# Patient Record
Sex: FEMALE | Race: WHITE | Employment: OTHER | URBAN - METROPOLITAN AREA
[De-identification: names, ages, dates, MRNs, and addresses within clinical notes are randomized per-mention and may not be internally consistent; named-entity substitution may affect disease eponyms.]

---

## 2017-08-30 ENCOUNTER — ALLSCRIPTS OFFICE VISIT (OUTPATIENT)
Dept: OTHER | Facility: OTHER | Age: 67
End: 2017-08-30

## 2017-08-30 DIAGNOSIS — G40.409 OTHER GENERALIZED EPILEPSY AND EPILEPTIC SYNDROMES, NOT INTRACTABLE, WITHOUT STATUS EPILEPTICUS (HCC): ICD-10-CM
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
## 2018-01-13 VITALS
BODY MASS INDEX: 30.96 KG/M2 | WEIGHT: 168.25 LBS | HEART RATE: 60 BPM | OXYGEN SATURATION: 99 % | DIASTOLIC BLOOD PRESSURE: 72 MMHG | SYSTOLIC BLOOD PRESSURE: 134 MMHG | HEIGHT: 62 IN

## 2018-03-28 LAB
ALBUMIN SERPL BCP-MCNC: 3.8 G/DL (ref 3–5.2)
AMORPHOUS MATERIAL (HISTORICAL): ABNORMAL
ANION GAP SERPL CALCULATED.3IONS-SCNC: 8 MMOL/L (ref 5–14)
BACTERIA UR QL AUTO: ABNORMAL
BILIRUB UR QL STRIP: NEGATIVE MG/DL
BUN SERPL-MCNC: 10 MG/DL (ref 5–25)
CALCIUM SERPL-MCNC: 9.3 MG/DL (ref 8.4–10.2)
CASTS/CASTS TYPE (HISTORICAL): ABNORMAL /LPF
CHLORIDE SERPL-SCNC: 96 MEQ/L (ref 97–108)
CLARITY UR: CLEAR
CO2 SERPL-SCNC: 26 MMOL/L (ref 22–30)
COLOR UR: ABNORMAL
CREATINE, SERUM (HISTORICAL): 0.67 MG/DL (ref 0.6–1.2)
CREATININE, RANDOM URINE (HISTORICAL): 127.8 MG/DL (ref 50–200)
CRYSTAL TYPE (HISTORICAL): ABNORMAL /HPF
DEPRECATED RDW RBC AUTO: 13.1 %
EGFR (HISTORICAL): >60 ML/MIN/1.73 M2
GLUCOSE SERPL-MCNC: 98 MG/DL (ref 70–99)
GLUCOSE UR STRIP-MCNC: NEGATIVE MG/DL
HCT VFR BLD AUTO: 37.8 % (ref 36–46)
HGB BLD-MCNC: 12.9 G/DL (ref 12–16)
HGB UR QL STRIP.AUTO: ABNORMAL
KETONES UR STRIP-MCNC: NEGATIVE MG/DL
LEUKOCYTE ESTERASE UR QL STRIP: ABNORMAL
MAGNESIUM SERPL-MCNC: 1.9 MG/DL (ref 1.6–2.3)
MCH RBC QN AUTO: 29.7 PG (ref 26–34)
MCHC RBC AUTO-ENTMCNC: 34 % (ref 31–36)
MCV RBC AUTO: 87 FL (ref 80–100)
MUCOUS THREADS URNS QL MICRO: ABNORMAL
NITRITE UR QL STRIP: NEGATIVE
NON-SQ EPI CELLS URNS QL MICRO: ABNORMAL
OTHER STN SPEC: ABNORMAL
PH UR STRIP.AUTO: 7 [PH] (ref 4.5–8)
PHOSPHATE SERPL-MCNC: 4.3 MG/DL (ref 2.5–4.8)
PLATELET # BLD AUTO: 458 K/MCL (ref 150–450)
POTASSIUM SERPL-SCNC: 4.4 MEQ/L (ref 3.6–5)
PROT UR STRIP-MCNC: NEGATIVE MG/DL
PROT UR-MCNC: 7 MG/DL
PROT/CREAT UR: 55 MG/G
RBC # BLD AUTO: 4.33 M/MCL (ref 4–5.2)
RBC #/AREA URNS AUTO: 2 /HPF
SODIUM SERPL-SCNC: 131 MEQ/L (ref 137–147)
SP GR UR STRIP.AUTO: 1.01 (ref 1–1.04)
URIC ACID (HISTORICAL): 2.7 MG/DL (ref 2.5–7.5)
UROBILINOGEN UR QL STRIP.AUTO: NEGATIVE MG/DL (ref 0–1)
WBC # BLD AUTO: 5.4 K/MCL (ref 4.5–11)
WBC #/AREA URNS AUTO: ABNORMAL /HPF

## 2018-09-07 ENCOUNTER — OFFICE VISIT (OUTPATIENT)
Dept: NEUROLOGY | Facility: CLINIC | Age: 68
End: 2018-09-07
Payer: MEDICARE

## 2018-09-07 VITALS
BODY MASS INDEX: 31.5 KG/M2 | DIASTOLIC BLOOD PRESSURE: 77 MMHG | HEIGHT: 62 IN | WEIGHT: 171.2 LBS | SYSTOLIC BLOOD PRESSURE: 166 MMHG | HEART RATE: 74 BPM

## 2018-09-07 DIAGNOSIS — G40.409 TONIC-CLONIC EPILEPTIC SEIZURES (HCC): Primary | ICD-10-CM

## 2018-09-07 PROCEDURE — 99213 OFFICE O/P EST LOW 20 MIN: CPT | Performed by: NURSE PRACTITIONER

## 2018-09-07 RX ORDER — LEVETIRACETAM 500 MG/1
500 TABLET ORAL
COMMUNITY
Start: 2014-06-06 | End: 2018-09-07 | Stop reason: SDUPTHER

## 2018-09-07 RX ORDER — BIOTIN 1 MG
TABLET ORAL DAILY
COMMUNITY

## 2018-09-07 RX ORDER — AMLODIPINE BESYLATE 5 MG/1
10 TABLET ORAL
COMMUNITY
Start: 2015-05-26

## 2018-09-07 RX ORDER — FOSINOPRIL SODIUM 40 MG/1
40 TABLET ORAL
COMMUNITY
Start: 2018-03-26

## 2018-09-07 RX ORDER — LEVETIRACETAM 500 MG/1
TABLET ORAL
Qty: 135 TABLET | Refills: 3 | Status: SHIPPED | OUTPATIENT
Start: 2018-09-07 | End: 2019-03-11 | Stop reason: SDUPTHER

## 2018-09-07 NOTE — ASSESSMENT & PLAN NOTE
No seizures since her last visit  She continues on keppra 250mg in the am and 500mg in the pm without any noted side effects  She may still be interested in reducing her dose further to 250mg BID, but she will get a keppra level checked first  A script was given for this today  She continues to follow closely with nephrology in regards to her hyponatremia and her levels have been stable recently  She was encouraged to call with any new or suspected seizure activity

## 2018-09-07 NOTE — PROGRESS NOTES
Patient ID: Will Eaton is a 77 y o  female  Assessment/Plan:    Tonic-clonic epileptic seizures ([address] )  No seizures since her last visit  She continues on keppra 250mg in the am and 500mg in the pm without any noted side effects  She may still be interested in reducing her dose further to 250mg BID, but she will get a keppra level checked first  A script was given for this today  She continues to follow closely with nephrology in regards to her hyponatremia and her levels have been stable recently  She was encouraged to call with any new or suspected seizure activity  Subjective:    Ms Will Eaton is a 42-year-old female with a history of 2 generalized tonic-clonic seizures, one in 1959, the second in 2012 who is here today for her annual follow-up  To review, for over 40 years prior to her second seizure, the patient had remained seizure-free without taking any seizure medications  The seizure in 2012 occurred in the setting of severe hyponatremia and malignant hypertension  It is possible, however, that apart from the generalized tonic-clonic seizures, the "day-dreaming episodes" that the patient had been having over the years were also seizures, as they stopped occurring after she was put on Keppra in 2012  MRI of the brain done in 2012 showed an area of 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  EEG showed an asymmetric driving response, less prominent on the right than on the left, suggesting subtle right hemisphere dysfunction  When Dr Katia Pelletier saw the patient on 8/2/16, the patient reported that she did not want to come off of her [address] because she believed that her being on [address] would reduce the chances of seizures occurring should she suffer recurrence of hyponatremia   However, she did want to reduce her dose, and since her level was 26, I reduced her dose from levetiracetam 500 mg tablets, 1 tab BID to ½ tab in the AM and 1 tab in the PM  There was some further discussion of decreasing the keppra to 250mg BID if her labs remained stable  Today Ms Antonette Lindsey presents for follow-up  She states that she has been doing well and has not had any seizures since her last visit  She remains on keppra 250mg in the am and 500mg in the pm  She is interested in possibly continuing to reduce the dose further, but would like to have her labs checked again before doing so  She denies any history of myoclonus, staring spells, automatisms, unexplained hyperkinetic behaviors, olfactory / gustatory hallucinations, epigastric rising events, shay vu events, visual hallucinations, unexplained nocturnal enuresis, or nocturnal tongue biting  She feels that her mood is overall stable  She continues to follow with nephrology, who is keeping an eye on her sodium  The following portions of the patient's history were reviewed and updated as appropriate: past family history, past social history and past surgical history  Objective:    Blood pressure 166/77, pulse 74, height 5' 2" (1 575 m), weight 77 7 kg (171 lb 3 2 oz)  Physical Exam   Constitutional: She appears well-developed and well-nourished  HENT:   Head: Normocephalic  Neurological: She has normal strength  Gait normal    Psychiatric: She has a normal mood and affect  Her speech is normal and behavior is normal    Vitals reviewed  Neurological Exam    Mental Status  The patient is alert  Her speech is normal  She has normal attention span and concentration  She follows multi-step commands  She has a normal fund of knowledge  Cranial Nerves    CN II: The patient's visual acuity and visual fields are normal     Motor  The patient has normal muscle bulk throughout  Her overall muscle tone is normal throughout  Her strength is 5/5 throughout all four extremities  Sensory  The patient's sensation is to light touch  Gait and Coordination  The patient has normal gait and station            ROS:    Review of Systems Constitutional: Positive for unexpected weight change  HENT: Positive for trouble swallowing  Sinus Problems   Hoarseness    Eyes: Negative  Respiratory: Negative  Cardiovascular: Negative  Gastrointestinal: Negative  Endocrine: Negative  Genitourinary: Negative  Musculoskeletal: Positive for neck pain  Joint Pain    Skin: Negative  Allergic/Immunologic: Negative  Neurological: Negative  Hematological: Negative  Psychiatric/Behavioral: Positive for sleep disturbance (Trouble staying asleep)

## 2018-09-07 NOTE — PATIENT INSTRUCTIONS
1  Get your levetiracetam level checked  If the level is stable, we can consider decreasing the keppra dose further if you would like  2  Continue on levetiracetam 250mg in the morning and 500mg in the evening  3  Call with any new or suspected seizures

## 2019-03-11 DIAGNOSIS — G40.409 TONIC-CLONIC EPILEPTIC SEIZURES (HCC): ICD-10-CM

## 2019-03-11 RX ORDER — LEVETIRACETAM 500 MG/1
TABLET ORAL
Qty: 135 TABLET | Refills: 3 | Status: SHIPPED | OUTPATIENT
Start: 2019-03-11 | End: 2020-04-01 | Stop reason: SDUPTHER

## 2019-03-11 NOTE — TELEPHONE ENCOUNTER
Pt requesting keppra rx be sent to [facility]  Rx entered  Pls sign off       Fax   ID [phone]

## 2019-04-08 ENCOUNTER — TELEPHONE (OUTPATIENT)
Dept: NEUROLOGY | Facility: CLINIC | Age: 69
End: 2019-04-08

## 2019-06-04 ENCOUNTER — TELEPHONE (OUTPATIENT)
Dept: NEUROLOGY | Facility: CLINIC | Age: 69
End: 2019-06-04

## 2019-06-04 ENCOUNTER — OFFICE VISIT (OUTPATIENT)
Dept: NEUROLOGY | Facility: CLINIC | Age: 69
End: 2019-06-04
Payer: MEDICARE

## 2019-06-04 VITALS
BODY MASS INDEX: 23.32 KG/M2 | SYSTOLIC BLOOD PRESSURE: 144 MMHG | DIASTOLIC BLOOD PRESSURE: 77 MMHG | HEART RATE: 83 BPM | HEIGHT: 62 IN | WEIGHT: 126.7 LBS

## 2019-06-04 DIAGNOSIS — R42 DIZZINESS AND GIDDINESS: Primary | ICD-10-CM

## 2019-06-04 DIAGNOSIS — F33.1 MODERATE EPISODE OF RECURRENT MAJOR DEPRESSIVE DISORDER (HCC): ICD-10-CM

## 2019-06-04 DIAGNOSIS — R26.89 IMBALANCE: ICD-10-CM

## 2019-06-04 DIAGNOSIS — G40.409 OTHER GENERALIZED EPILEPSY AND EPILEPTIC SYNDROMES, NOT INTRACTABLE, WITHOUT STATUS EPILEPTICUS (HCC): ICD-10-CM

## 2019-06-04 PROCEDURE — 99215 OFFICE O/P EST HI 40 MIN: CPT | Performed by: NURSE PRACTITIONER

## 2019-06-04 RX ORDER — MIRTAZAPINE 7.5 MG/1
7.5 TABLET, FILM COATED ORAL
Qty: 30 TABLET | Refills: 3 | Status: SHIPPED | OUTPATIENT
Start: 2019-06-04

## 2019-06-18 ENCOUNTER — HOSPITAL ENCOUNTER (OUTPATIENT)
Dept: CT IMAGING | Facility: HOSPITAL | Age: 69
Discharge: HOME | End: 2019-06-18
Payer: MEDICARE

## 2019-06-18 DIAGNOSIS — R26.89 IMBALANCE: ICD-10-CM

## 2019-06-18 DIAGNOSIS — R42 DIZZINESS AND GIDDINESS: ICD-10-CM

## 2019-06-18 DIAGNOSIS — G40.409 OTHER GENERALIZED EPILEPSY AND EPILEPTIC SYNDROMES, NOT INTRACTABLE, WITHOUT STATUS EPILEPTICUS (HCC): ICD-10-CM

## 2019-06-18 PROCEDURE — 70496 CT ANGIOGRAPHY HEAD: CPT

## 2019-06-18 PROCEDURE — 70498 CT ANGIOGRAPHY NECK: CPT

## 2019-06-18 RX ADMIN — IOHEXOL 100 ML: 350 INJECTION, SOLUTION INTRAVENOUS at 12:08

## 2019-06-24 ENCOUNTER — TELEPHONE (OUTPATIENT)
Dept: NEUROLOGY | Facility: CLINIC | Age: 69
End: 2019-06-24

## 2019-07-03 ENCOUNTER — TELEPHONE (OUTPATIENT)
Dept: NEUROLOGY | Facility: CLINIC | Age: 69
End: 2019-07-03

## 2019-07-03 NOTE — TELEPHONE ENCOUNTER
----- Message from Juanito Sharma MD sent at 7/3/2019  5:22 PM EDT -----  Regarding: FW: Test Results Question  Contact: [phone]  I haven't seen this patient since 2017, but Maxine Singh saw her recently and ordered CTA head and neck because the patient had been having dizzy spells and has two family members who  of ruptured cerebral aneurysms  Please call the patient's daughter and reassure her that the CT did not show any aneurysms  There are however several arteries with plaques and some narrowing  It's POSSIBLE that the narrowing and the plaques are causing the dizzy spells, but it's MUCH more likely that the dizziness is caused by the patient's poor oral intake  Per [facility] note, the patient's  passed away a few months ago, and since then the patient's weight dropped from 171 lbs to 126 lbs  She reportedly eats 2-3 meals a day but not much each time  I recommend that the patient see her PCP about the weight loss and the dizziness  If the patient's PO intake improves and she continues to have dizzy spells, then we need to make an appointment for the patient to see one of our general neurologists or stroke specialists to see if the abnormalities on the patient's CTA are responsible for the episodes  Daughter should call us right away if the dizziness gets worse or the patient starts having other neurologic deficits, e g  focal weakness/numbness, speech difficulty, visual loss or double vision  If any of these deficits start suddenly, pt should go to the nearest [address] [place] [address] for possible stroke    ----- Message -----  From: Sue Melendez  Sent: 2019  11:29 AM EDT  To: Juanito Sharma MD  Subject: FW: Test Results Question                            ----- Message -----  From: Justin De La Rosa  Sent: 2019  11:26 AM EDT  To: Neurology [facility]  Subject: Test Results Question                            Hi-  Just following up on the CT results   It seems like there is nothing concerning but wanted to double check  Any information would be helpful  My Mom has agreed to start the Remeron and has been on it for a week now  She also is going to be starting therapy     Thanks,  Minesh Jacinto

## 2020-02-01 ENCOUNTER — TRANSCRIBE ORDERS (OUTPATIENT)
Dept: ADMINISTRATIVE | Facility: HOSPITAL | Age: 70
End: 2020-02-01

## 2020-02-01 ENCOUNTER — APPOINTMENT (OUTPATIENT)
Dept: LAB | Facility: IMAGING CENTER | Age: 70
End: 2020-02-01
Payer: MEDICARE

## 2020-02-01 DIAGNOSIS — I25.84 CORONARY ARTERY CALCIFICATION: Primary | ICD-10-CM

## 2020-02-01 DIAGNOSIS — E78.2 MIXED HYPERLIPIDEMIA: ICD-10-CM

## 2020-02-01 DIAGNOSIS — I25.84 CORONARY ARTERY CALCIFICATION: ICD-10-CM

## 2020-02-01 DIAGNOSIS — I25.10 CORONARY ARTERY CALCIFICATION: Primary | ICD-10-CM

## 2020-02-01 DIAGNOSIS — I25.10 CORONARY ARTERY CALCIFICATION: ICD-10-CM

## 2020-02-01 LAB
CHOLEST SERPL-MCNC: 244 MG/DL (ref 50–200)
HDLC SERPL-MCNC: 64 MG/DL
LDLC SERPL CALC-MCNC: 161 MG/DL (ref 0–100)
NONHDLC SERPL-MCNC: 180 MG/DL
TRIGL SERPL-MCNC: 94 MG/DL

## 2020-02-01 PROCEDURE — 36415 COLL VENOUS BLD VENIPUNCTURE: CPT

## 2020-02-01 PROCEDURE — 80061 LIPID PANEL: CPT

## 2020-04-01 DIAGNOSIS — G40.409 TONIC-CLONIC EPILEPTIC SEIZURES (HCC): ICD-10-CM

## 2020-04-02 RX ORDER — LEVETIRACETAM 500 MG/1
TABLET ORAL
Qty: 135 TABLET | Refills: 3 | Status: SHIPPED | OUTPATIENT
Start: 2020-04-02 | End: 2021-03-26 | Stop reason: SDUPTHER

## 2020-04-15 ENCOUNTER — TELEPHONE (OUTPATIENT)
Dept: NEUROLOGY | Facility: CLINIC | Age: 70
End: 2020-04-15

## 2020-04-24 ENCOUNTER — TELEMEDICINE (OUTPATIENT)
Dept: NEUROLOGY | Facility: CLINIC | Age: 70
End: 2020-04-24
Payer: MEDICARE

## 2020-04-24 ENCOUNTER — TELEPHONE (OUTPATIENT)
Dept: NEUROLOGY | Facility: CLINIC | Age: 70
End: 2020-04-24

## 2020-04-24 DIAGNOSIS — R42 DIZZINESS AND GIDDINESS: ICD-10-CM

## 2020-04-24 DIAGNOSIS — F33.1 MODERATE EPISODE OF RECURRENT MAJOR DEPRESSIVE DISORDER (HCC): ICD-10-CM

## 2020-04-24 DIAGNOSIS — G40.409 OTHER GENERALIZED EPILEPSY AND EPILEPTIC SYNDROMES, NOT INTRACTABLE, WITHOUT STATUS EPILEPTICUS (HCC): Primary | ICD-10-CM

## 2020-04-24 PROCEDURE — 99214 OFFICE O/P EST MOD 30 MIN: CPT | Performed by: NURSE PRACTITIONER

## 2020-04-24 RX ORDER — CLONAZEPAM 0.5 MG/1
0.25 TABLET ORAL DAILY PRN
COMMUNITY

## 2020-04-24 RX ORDER — PANTOPRAZOLE SODIUM 40 MG/1
20 TABLET, DELAYED RELEASE ORAL DAILY
COMMUNITY

## 2020-08-13 ENCOUNTER — OFFICE VISIT (OUTPATIENT)
Dept: NEUROLOGY | Facility: CLINIC | Age: 70
End: 2020-08-13
Payer: MEDICARE

## 2020-08-13 VITALS
WEIGHT: 115.6 LBS | HEIGHT: 62 IN | BODY MASS INDEX: 21.27 KG/M2 | DIASTOLIC BLOOD PRESSURE: 68 MMHG | SYSTOLIC BLOOD PRESSURE: 120 MMHG | HEART RATE: 84 BPM | TEMPERATURE: 98 F

## 2020-08-13 DIAGNOSIS — F33.1 MODERATE EPISODE OF RECURRENT MAJOR DEPRESSIVE DISORDER (HCC): ICD-10-CM

## 2020-08-13 DIAGNOSIS — G40.409 OTHER GENERALIZED EPILEPSY AND EPILEPTIC SYNDROMES, NOT INTRACTABLE, WITHOUT STATUS EPILEPTICUS (HCC): Primary | ICD-10-CM

## 2020-08-13 PROCEDURE — 99214 OFFICE O/P EST MOD 30 MIN: CPT | Performed by: PSYCHIATRY & NEUROLOGY

## 2020-08-13 NOTE — PROGRESS NOTES
Patient ID: Candyce Sicard is a 65 y o  female  Assessment/Plan:     Diagnoses and all orders for this visit:    Other generalized epilepsy and epileptic syndromes, not intractable, without status epilepticus ([facility] [address] )    Moderate episode of recurrent major depressive disorder ([facility] [address] )           Discussion Summary:    No seizures since last visit  No side effects from current anti-epilepsy drugs  Patient is clearly depressed and levetiracetam can exacerbate depression but the cause of her current depression is quite clear and she had been on levetiracetam for quite some time without it causing any problems  I recommended that the patient see a psychological counselor but stated the therapist that she had been seeing had left and she doesn't want to see another one  Follow up in 6 months  Subjective:        HPI     Candyce Sicard is a 65 y o  female with a history of 2 generalized tonic-clonic seizures (1959, 2012)  The latter in the setting of severe hyponatremia and malignant hypertension  It is also important to note that she did have episodes of daydreaming that resolved after starting  levetiracetam in 2012  She also has a history of depression, thrombocytosis, coronary artery calcification, and recent recurrence constipation with urinary retention  She was last seen in the office on 06/04/2019 by KATTY Mcallister  At that time, she was had been Experiencing dizziness related to position changes causing imbalance and near falls  CTA was obtained to rule out aneurysm as she does have a strong family history  This was unrevealing for cause of her dizziness  Patient was struggling with depression so she was started on Remeron to help with her mood, sleep issues, and lack of appetite      Since her last visit, she has remained seizure free on levetiracetam 250-500  Denies any side effects or issues with compliance  No staring spells  No evidence of nocturnal seizure   No concerning myoclonus       Dizziness and near falls resolved  Her current concerns are related to her 4 fecal impactions since the fall  The bowel gets so full it obstructs the bladder  She now has a chronic hong catheter  Daughter states that she sees gastro and colorectal   Started linzess today  She is not eating or drinking well, likely related to depression       April/May of 2019 started to get depressed and anxious after  passed away in February of 2019  Her mood has been the same since last year when she was started on Remeron  Daughter thinks it is depression causing her to lay on the couch, not move, and not eat  She used to read the paper every day, garden, and go for walks  She has lost interest in activities that used to make her happy  Patient states she is not depressed  Patient is not agreeable to trying another medication or seeing psychiatry  Daughter does work for psychiatrist who has adjusted the dose of her remeron, dose was recently decreased related to concerns over the patient's constipation  She had been up to a dose of 22 5mg daily and is now at 15mg daily  The patient denies any suicidal, self harm, or homicidal ideations       She is having trouble sleeping  Refused melatonin  Takes Clonazepam at 11am for anxiety, this helps her to stay calm  She is unsure if remeron helps       Current seizure medications:  - levetiracetam 250mg in the morning and 500mg at night  Other medications as per Epic      Prior Medications: phenobarbital     Prior Workup:     No brain MRI or EEG reports available to be reviewed in [facility] or Care Everywhere  Per Karla Burrell note on 06/02/2019:   "MRI of the brain done in 2012 showed an area of 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  EEG showed an asymmetric driving response, less prominent on the right than on the left, suggesting subtle right hemisphere dysfunction  "     06/18/2019 CTA head and neck w wo contrast:  "IMPRESSION:     Atherosclerotic plaque and mild luminal narrowing of the proximal right internal carotid artery  No evidence for high-grade stenosis or focal occlusion of the cervical vasculature utilizing NASCET criteria      No major branch vessel occlusion identified  Moderate to severe short segment luminal narrowing of the distal right M1 segment of the middle cerebral artery as well as proximal right P2 segment of the posterior cerebral artery      Additional scattered areas of atherosclerotic irregularity and luminal narrowing as described above "     The following portions of the patient's history were reviewed and updated as appropriate: allergies, current medications, past family history, past medical history, past social history, past surgical history and problem list          INTERVAL HISTORY    No seizures since last visit  No side effects from current anti-epilepsy drugs  Patient admits to being depressed  She has lost a significant amount of weight since her  passed away from lung cancer on 4/19  She states that she feels like she has a "hegative attitude" spending all day sleeping on the couch and relying on Meals on Wheels for food  Current AED's  levetiracetam 500 mg 1/2 tablet in morning and 1/2 tablet at night  Objective:    Blood pressure 120/68, pulse 84, temperature 98 °F (36 7 °C), height 5' 1 5" (1 562 m), weight 52 4 kg (115 lb 9 6 oz)  Physical Exam  Vitals signs reviewed  HENT:      Head: Normocephalic and atraumatic  Eyes:      General: Lids are normal       Extraocular Movements: Extraocular movements intact  Conjunctiva/sclera: Conjunctivae normal       Pupils: Pupils are equal, round, and reactive to light  Pulmonary:      Effort: Pulmonary effort is normal    Neurological:      Coordination: Romberg sign negative  Deep Tendon Reflexes: Strength normal and reflexes are normal and symmetric     Psychiatric:         Speech: Speech normal          Behavior: Behavior normal          Neurological Exam  Mental Status  Awake, alert and oriented to person, place and time  Speech is normal  Language is fluent with no aphasia  Cranial Nerves  CN II: Visual acuity is normal  Visual fields full to confrontation  CN III, IV, VI: Extraocular movements intact bilaterally  Normal lids and orbits bilaterally  Pupils equal round and reactive to light bilaterally  CN V: Facial sensation is normal   CN VII: Full and symmetric facial movement  CN VIII: Hearing is normal   CN IX, X: Palate elevates symmetrically  Normal gag reflex  CN XI: Shoulder shrug strength is normal   CN XII: Tongue midline without atrophy or fasciculations  Motor   Strength is 5/5 throughout all four extremities  Sensory  Light touch is normal in upper and lower extremities  Reflexes  Deep tendon reflexes are 2+ and symmetric in all four extremities with downgoing toes bilaterally  Coordination  Right: Finger-to-nose normal   Left: Finger-to-nose normal     Gait  Casual gait is normal including stance, stride, and arm swing  Normal tandem gait  Romberg is absent  ROS:    Review of Systems   Constitutional: Negative  Negative for appetite change and fever  HENT: Negative  Negative for hearing loss, tinnitus, trouble swallowing and voice change  Eyes: Negative  Negative for photophobia and pain  Respiratory: Negative  Negative for shortness of breath  Cardiovascular: Negative  Negative for palpitations  Gastrointestinal: Negative  Negative for nausea and vomiting  Endocrine: Negative  Negative for cold intolerance  Genitourinary: Negative  Negative for dysuria, frequency and urgency  Musculoskeletal: Positive for gait problem  Negative for myalgias and neck pain  Skin: Negative  Negative for rash     Neurological: Negative for dizziness, tremors, seizures, syncope, facial asymmetry, speech difficulty, weakness, light-headedness, numbness and headaches  Hematological: Negative  Does not bruise/bleed easily  Psychiatric/Behavioral: Positive for decreased concentration  Negative for confusion, hallucinations and sleep disturbance  Total of time  spent face-to-face with patient obtaining interval history and examining the patient, discussing findings was 25 minutes, with 20 minutes spent in counseling patient regarding concerns, possible  treatment options and providing detailed instructions regarding plan for work-up and treatment

## 2020-08-27 PROBLEM — K56.41 FECAL IMPACTION (HCC): Status: ACTIVE | Noted: 2020-08-27

## 2021-03-15 ENCOUNTER — TELEPHONE (OUTPATIENT)
Dept: NEUROLOGY | Facility: CLINIC | Age: 71
End: 2021-03-15

## 2021-03-26 ENCOUNTER — OFFICE VISIT (OUTPATIENT)
Dept: NEUROLOGY | Facility: CLINIC | Age: 71
End: 2021-03-26
Payer: MEDICARE

## 2021-03-26 VITALS
BODY MASS INDEX: 23.42 KG/M2 | WEIGHT: 126 LBS | HEART RATE: 88 BPM | DIASTOLIC BLOOD PRESSURE: 67 MMHG | SYSTOLIC BLOOD PRESSURE: 120 MMHG

## 2021-03-26 DIAGNOSIS — G40.409 TONIC-CLONIC EPILEPTIC SEIZURES (HCC): ICD-10-CM

## 2021-03-26 PROCEDURE — 99215 OFFICE O/P EST HI 40 MIN: CPT | Performed by: PSYCHIATRY & NEUROLOGY

## 2021-03-26 RX ORDER — LEVETIRACETAM 500 MG/1
TABLET ORAL
Qty: 135 TABLET | Refills: 3 | Status: SHIPPED | OUTPATIENT
Start: 2021-03-26 | End: 2022-04-15

## 2021-03-26 NOTE — PATIENT INSTRUCTIONS
1  Continue levetiracetam 250 mg in the morning and 500 mg at night  2  Return in about one year to see KATTY Phillips
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
## 2022-04-19 ENCOUNTER — TELEPHONE (OUTPATIENT)
Dept: NEUROLOGY | Facility: CLINIC | Age: 72
End: 2022-04-19

## 2022-04-29 ENCOUNTER — OFFICE VISIT (OUTPATIENT)
Dept: NEUROLOGY | Facility: CLINIC | Age: 72
End: 2022-04-29
Payer: MEDICARE

## 2022-04-29 VITALS
SYSTOLIC BLOOD PRESSURE: 126 MMHG | HEIGHT: 62 IN | WEIGHT: 161.2 LBS | BODY MASS INDEX: 29.66 KG/M2 | HEART RATE: 99 BPM | TEMPERATURE: 96.9 F | DIASTOLIC BLOOD PRESSURE: 71 MMHG | RESPIRATION RATE: 18 BRPM

## 2022-04-29 DIAGNOSIS — G40.409 OTHER GENERALIZED EPILEPSY AND EPILEPTIC SYNDROMES, NOT INTRACTABLE, WITHOUT STATUS EPILEPTICUS (HCC): Primary | ICD-10-CM

## 2022-04-29 PROCEDURE — 99214 OFFICE O/P EST MOD 30 MIN: CPT | Performed by: PHYSICIAN ASSISTANT
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
## 2022-04-29 NOTE — PATIENT INSTRUCTIONS
1  Continue levetiracetam 250mg in the AM and 500mg in the PM  2   Return in 1 year to see Dr Diana Nicholson, or sooner if needed

## 2022-10-12 DIAGNOSIS — G40.409 TONIC-CLONIC EPILEPTIC SEIZURES (HCC): ICD-10-CM

## 2022-10-13 RX ORDER — LEVETIRACETAM 500 MG/1
TABLET ORAL
Qty: 135 TABLET | Refills: 3 | Status: SHIPPED | OUTPATIENT
Start: 2022-10-13